# Patient Record
Sex: FEMALE | Race: WHITE | NOT HISPANIC OR LATINO | Employment: FULL TIME | URBAN - METROPOLITAN AREA
[De-identification: names, ages, dates, MRNs, and addresses within clinical notes are randomized per-mention and may not be internally consistent; named-entity substitution may affect disease eponyms.]

---

## 2022-06-14 ENCOUNTER — TELEPHONE (OUTPATIENT)
Dept: OBSTETRICS AND GYNECOLOGY | Facility: CLINIC | Age: 52
End: 2022-06-14

## 2022-06-14 DIAGNOSIS — Z12.31 OTHER SCREENING MAMMOGRAM: Primary | ICD-10-CM

## 2022-10-10 ENCOUNTER — OFFICE VISIT (OUTPATIENT)
Dept: OBSTETRICS AND GYNECOLOGY | Facility: CLINIC | Age: 52
End: 2022-10-10

## 2022-10-10 VITALS
HEIGHT: 57 IN | BODY MASS INDEX: 28.61 KG/M2 | HEART RATE: 82 BPM | WEIGHT: 132.6 LBS | SYSTOLIC BLOOD PRESSURE: 109 MMHG | DIASTOLIC BLOOD PRESSURE: 72 MMHG

## 2022-10-10 DIAGNOSIS — Z01.419 WELL WOMAN EXAM: Primary | ICD-10-CM

## 2022-10-10 PROCEDURE — 99396 PREV VISIT EST AGE 40-64: CPT | Performed by: NURSE PRACTITIONER

## 2022-10-10 RX ORDER — BUPROPION HYDROCHLORIDE 150 MG/1
150 TABLET ORAL DAILY
COMMUNITY
Start: 2022-07-05

## 2022-10-10 RX ORDER — ROPINIROLE 0.5 MG/1
TABLET, FILM COATED ORAL
COMMUNITY
Start: 2022-08-26

## 2022-10-10 RX ORDER — LEVOFLOXACIN 750 MG/1
TABLET ORAL
COMMUNITY
Start: 2022-08-08

## 2022-10-10 RX ORDER — CEPHALEXIN 500 MG/1
CAPSULE ORAL
COMMUNITY
Start: 2022-08-02

## 2022-10-10 RX ORDER — BUTALBITAL, ACETAMINOPHEN AND CAFFEINE 50; 325; 40 MG/1; MG/1; MG/1
1 TABLET ORAL
COMMUNITY

## 2022-10-10 RX ORDER — OMEPRAZOLE 40 MG/1
40 CAPSULE, DELAYED RELEASE ORAL DAILY
COMMUNITY
Start: 2022-08-26

## 2022-10-10 RX ORDER — CEFUROXIME AXETIL 500 MG/1
500 TABLET ORAL
COMMUNITY

## 2022-10-10 RX ORDER — VORTIOXETINE 10 MG/1
10 TABLET, FILM COATED ORAL DAILY
COMMUNITY
Start: 2022-09-29

## 2022-10-10 RX ORDER — BETHANECHOL CHLORIDE 50 MG/1
TABLET ORAL
COMMUNITY
Start: 2022-08-26

## 2022-10-10 NOTE — PROGRESS NOTES
"  HPI:   51 y.o. . Presents for well woman exam. Contraception or HRT: Post menopausal, using no HRT  Menses:  No vaginal bleeding  Pain:  None  Last pap normal   Complaints: Pt has no complaints today.  Patient reports that she is not currently experiencing any symptoms of urinary incontinence.  Under care of urology for ureter strictures    Past Medical History:   Diagnosis Date   • Anxiety    • Migraine    • Urinary tract infection       Past Surgical History:   Procedure Laterality Date   • BLADDER SURGERY     • KIDNEY SURGERY     • KNEE SURGERY      partial knee replacement L   • SKIN BIOPSY     • TUBAL ABDOMINAL LIGATION Left       Family History   Problem Relation Age of Onset   • Colon cancer Father 60   • Throat cancer Father    • Breast cancer Sister 50   • Ovarian cancer Neg Hx    • Uterine cancer Neg Hx    • Prostate cancer Neg Hx      Allergies as of 10/10/2022 - Reviewed 10/10/2022   Allergen Reaction Noted   • Macrobid [nitrofurantoin] Irritability 10/10/2022   • Sulfa antibiotics Irritability 2019        PCP: does manage PMHx and preventative labs    /72   Pulse 82   Ht 144.8 cm (57\")   Wt 60.1 kg (132 lb 9.6 oz)   Breastfeeding No   BMI 28.69 kg/m²     PHYSICAL EXAM: Chaperone present   General- NAD, alert and oriented, appropriate  Psych- Normal mood, good memory  Neck- No masses, no thyroid enlargement  Lymphatic- No palpable neck, axillary, or groin nodes  CV- Regular rhythm, no murmurs  Resp- CTA to bases, no wheezes  Abdomen- Soft, non distended, non tender, no masses  Breast left-  Bilaterally symmetrical, no masses, non tender, no nipple discharge  Breast right- Bilaterally symmetrical, no masses, non tender, no nipple discharge  External genitalia- Normal female, no lesions  Urethra/meatus- Normal, no masses, non tender, no prolapse  Bladder- Normal, no masses, non tender, no prolapse  Vagina- Normal, no atrophy, no lesions, no discharge, no prolapse  Cvx- " Normal, no lesions, no discharge, No cervical motion tenderness  Uterus- Normal size, shape & consistency.  Non tender, mobile, & no prolapse  Adnexa- No mass, non tender  Anus/Rectum/Perineum- Not performed  Ext- No edema, no cyanosis    Skin- No lesions, no rashes, no acanthosis nigricans      ASSESSMENT and PLAN:    Diagnoses and all orders for this visit:    1. Well woman exam (Primary)      Preventative:   BREAST HEALTH- Monthly self breast exam importance and how to reviewed. MMG and/or MRI (prn) reviewed per society guidelines and her individual history. Screen: Updated today  CERVICAL CANCER Screening- Reviewed current ASCCP guidelines for screening w and wo cotest HPV, age specific.  Screen: Already up to date  COLON CANCER Screening- Reviewed current medical society guidelines and options.  Screen:  Already up to date  BONE HEALTH- Reviewed current medical society guidelines and options for testing, calcium and vit D intake.  Weight bearing exercise.  DEXA: Already up to date  VACCINATIONS Recommended: COVID and booster PRN, Flu annually.  Importance discussed, risk being unvaccinated reviewed.  Questions answered  SMOKING STATUS- pt does use nicotine and/or tobacco.  I reviewed importance of avoiding.  Recommend FU w PCP regarding quitting options if unable to quit wo assistance. I recommend pt FU w PCP to schedule/monitor screening for lung CA  Follow up PCP/Specialist PMHx and Labs  [facility]: Does not qualify.  Tobacco cessation reviewed.    She understands the importance of having any ordered tests to be performed in a timely fashion.  The risks of not performing them include, but are not limited to, advanced cancer stages, bone loss from osteoporosis and/or subsequent increase in morbidity and/or mortality.  She is encouraged to review her results online and/or contact or office if she has questions.     Follow Up:  Return in about 1 year (around 10/10/2023).        Abigail Figueroa, APRN  10/10/2022

## 2022-10-11 ENCOUNTER — TELEPHONE (OUTPATIENT)
Dept: OBSTETRICS AND GYNECOLOGY | Facility: CLINIC | Age: 52
End: 2022-10-11

## 2022-10-11 NOTE — TELEPHONE ENCOUNTER
Caller: Ave Hooker    Relationship: Self    Best call back number: [phone]    What form or medical record are you requesting: WORK NOTE FROM 10.10.22    Who is requesting this form or medical record from you: PATIENT/EMPLOYER    How would you like to receive the form or medical records (pick-up, mail, fax): FAX  If fax, what is the fax number: [phone]    Timeframe paperwork needed: ASAP    Additional notes:   PT IS REQUESTING A WORK NOTE FROM HER VISIT ON 10.10.22 BE FAXED [phone]